# Patient Record
Sex: FEMALE | Race: WHITE | NOT HISPANIC OR LATINO | Employment: OTHER | URBAN - METROPOLITAN AREA
[De-identification: names, ages, dates, MRNs, and addresses within clinical notes are randomized per-mention and may not be internally consistent; named-entity substitution may affect disease eponyms.]

---

## 2020-01-28 PROBLEM — G57.93 NEUROPATHY INVOLVING BOTH LOWER EXTREMITIES: Status: ACTIVE | Noted: 2018-06-25

## 2020-01-28 PROBLEM — M25.519 SHOULDER JOINT PAIN: Status: ACTIVE | Noted: 2018-02-27

## 2020-01-28 PROBLEM — R00.2 PALPITATIONS: Status: ACTIVE | Noted: 2018-08-27

## 2020-01-28 PROBLEM — R42 POSTURAL DIZZINESS: Status: ACTIVE | Noted: 2018-08-27

## 2020-01-28 PROBLEM — Z72.0 TOBACCO ABUSE DISORDER: Status: ACTIVE | Noted: 2018-06-25

## 2020-01-28 PROBLEM — M72.2 PLANTAR FASCIITIS, BILATERAL: Status: ACTIVE | Noted: 2018-06-25

## 2020-01-28 PROBLEM — G44.229 CHRONIC TENSION-TYPE HEADACHE, NOT INTRACTABLE: Status: ACTIVE | Noted: 2019-01-29

## 2020-01-28 PROBLEM — Z98.890 S/P GASTRIC SURGERY: Status: ACTIVE | Noted: 2020-01-28

## 2020-01-28 PROBLEM — C18.9 MALIGNANT NEOPLASM OF COLON: Status: ACTIVE | Noted: 2017-01-26

## 2020-01-28 PROBLEM — H65.113: Status: ACTIVE | Noted: 2018-06-25

## 2020-06-02 PROBLEM — R19.7 DIARRHEA: Status: ACTIVE | Noted: 2020-06-02

## 2020-08-07 PROBLEM — F32.9 REACTIVE DEPRESSION: Status: ACTIVE | Noted: 2017-01-07

## 2021-04-13 PROBLEM — G62.9 NEUROPATHY: Status: ACTIVE | Noted: 2021-04-13

## 2021-04-13 PROBLEM — E53.8 VITAMIN B12 DEFICIENCY: Status: ACTIVE | Noted: 2021-04-13

## 2021-05-12 ENCOUNTER — PATIENT MESSAGE (OUTPATIENT)
Dept: RESEARCH | Facility: HOSPITAL | Age: 56
End: 2021-05-12

## 2022-07-27 PROBLEM — G57.93 NEUROPATHY INVOLVING BOTH LOWER EXTREMITIES: Status: RESOLVED | Noted: 2018-06-25 | Resolved: 2022-07-27

## 2022-07-27 PROBLEM — R19.7 DIARRHEA: Status: RESOLVED | Noted: 2020-06-02 | Resolved: 2022-07-27

## 2022-07-27 PROBLEM — R00.2 PALPITATIONS: Status: RESOLVED | Noted: 2018-08-27 | Resolved: 2022-07-27

## 2022-07-27 PROBLEM — R42 POSTURAL DIZZINESS: Status: RESOLVED | Noted: 2018-08-27 | Resolved: 2022-07-27

## 2023-08-24 PROBLEM — Z12.11 SCREEN FOR COLON CANCER: Status: ACTIVE | Noted: 2023-08-24

## 2023-08-24 PROBLEM — Z85.038 PERSONAL HISTORY OF COLON CANCER: Status: ACTIVE | Noted: 2023-08-24

## 2024-01-11 DIAGNOSIS — Z00.00 ENCOUNTER FOR MEDICARE ANNUAL WELLNESS EXAM: ICD-10-CM

## 2025-01-30 ENCOUNTER — PATIENT OUTREACH (OUTPATIENT)
Dept: ADMINISTRATIVE | Facility: OTHER | Age: 60
End: 2025-01-30

## 2025-03-11 PROBLEM — E66.01 SEVERE OBESITY: Status: ACTIVE | Noted: 2025-03-11

## 2025-03-31 ENCOUNTER — PATIENT OUTREACH (OUTPATIENT)
Dept: ADMINISTRATIVE | Facility: OTHER | Age: 60
End: 2025-03-31

## 2025-03-31 NOTE — PROGRESS NOTES
CHW - Outreach Attempt    Community Health Worker left a voicemail message for 1st attempt to contact patient regarding: follow up phone interview.   Community Health Worker to attempt to contact patient on: 03/31/2025.

## 2025-04-30 ENCOUNTER — PATIENT OUTREACH (OUTPATIENT)
Dept: ADMINISTRATIVE | Facility: OTHER | Age: 60
End: 2025-04-30

## 2025-04-30 NOTE — PROGRESS NOTES
CHW - Follow Up    This Community Health Worker completed a follow up visit with patient during clinic visit today.  Pt/Caregiver reported: patient reported she is doing alright no assistance is needed during follow up clinic visit   Patient will reach out if assistance is needed in the future.   Community Health Worker provided: patient reported she is doing alright   Follow up required: no  Case Closure - Due: 4/30/2025               CHW - Case Closure    This Community Health Worker spoke to patient during clinic visit today.   Pt/Caregiver reported: patient reported she is doing alright no assistance is needed during follow up clinic visit   Patient will reach out if assistance is needed in the future.   Pt/Caregiver denied any additional needs at this time and agrees with episode closure at this time.    Provided patient with Community Health Worker's contact information and encouraged   her to contact this Community Health Worker if additional needs arise.